# Patient Record
Sex: MALE | Race: BLACK OR AFRICAN AMERICAN | NOT HISPANIC OR LATINO | URBAN - METROPOLITAN AREA
[De-identification: names, ages, dates, MRNs, and addresses within clinical notes are randomized per-mention and may not be internally consistent; named-entity substitution may affect disease eponyms.]

---

## 2019-10-14 ENCOUNTER — EMERGENCY (EMERGENCY)
Facility: HOSPITAL | Age: 29
LOS: 1 days | Discharge: HOME | End: 2019-10-14
Attending: EMERGENCY MEDICINE | Admitting: EMERGENCY MEDICINE
Payer: MEDICAID

## 2019-10-14 VITALS
OXYGEN SATURATION: 98 % | HEART RATE: 70 BPM | TEMPERATURE: 98 F | SYSTOLIC BLOOD PRESSURE: 136 MMHG | DIASTOLIC BLOOD PRESSURE: 87 MMHG | RESPIRATION RATE: 14 BRPM

## 2019-10-14 VITALS
SYSTOLIC BLOOD PRESSURE: 127 MMHG | RESPIRATION RATE: 19 BRPM | DIASTOLIC BLOOD PRESSURE: 83 MMHG | TEMPERATURE: 98 F | HEIGHT: 72 IN | HEART RATE: 79 BPM | OXYGEN SATURATION: 100 % | WEIGHT: 229.94 LBS

## 2019-10-14 LAB
ALBUMIN SERPL ELPH-MCNC: 3.8 G/DL — SIGNIFICANT CHANGE UP (ref 3.3–5)
ALP SERPL-CCNC: 37 U/L — SIGNIFICANT CHANGE UP (ref 30–120)
ALT FLD-CCNC: 18 U/L DA — SIGNIFICANT CHANGE UP (ref 10–60)
ANION GAP SERPL CALC-SCNC: 7 MMOL/L — SIGNIFICANT CHANGE UP (ref 5–17)
AST SERPL-CCNC: 20 U/L — SIGNIFICANT CHANGE UP (ref 10–40)
BILIRUB SERPL-MCNC: 0.2 MG/DL — SIGNIFICANT CHANGE UP (ref 0.2–1.2)
BUN SERPL-MCNC: 8 MG/DL — SIGNIFICANT CHANGE UP (ref 7–23)
CALCIUM SERPL-MCNC: 9.3 MG/DL — SIGNIFICANT CHANGE UP (ref 8.4–10.5)
CHLORIDE SERPL-SCNC: 100 MMOL/L — SIGNIFICANT CHANGE UP (ref 96–108)
CO2 SERPL-SCNC: 30 MMOL/L — SIGNIFICANT CHANGE UP (ref 22–31)
CREAT SERPL-MCNC: 1.03 MG/DL — SIGNIFICANT CHANGE UP (ref 0.5–1.3)
GLUCOSE SERPL-MCNC: 81 MG/DL — SIGNIFICANT CHANGE UP (ref 70–99)
HCT VFR BLD CALC: 42 % — SIGNIFICANT CHANGE UP (ref 39–50)
HGB BLD-MCNC: 13.2 G/DL — SIGNIFICANT CHANGE UP (ref 13–17)
MCHC RBC-ENTMCNC: 26.5 PG — LOW (ref 27–34)
MCHC RBC-ENTMCNC: 31.4 GM/DL — LOW (ref 32–36)
MCV RBC AUTO: 84.3 FL — SIGNIFICANT CHANGE UP (ref 80–100)
NRBC # BLD: 0 /100 WBCS — SIGNIFICANT CHANGE UP (ref 0–0)
PLATELET # BLD AUTO: 199 K/UL — SIGNIFICANT CHANGE UP (ref 150–400)
POTASSIUM SERPL-MCNC: 4.2 MMOL/L — SIGNIFICANT CHANGE UP (ref 3.5–5.3)
POTASSIUM SERPL-SCNC: 4.2 MMOL/L — SIGNIFICANT CHANGE UP (ref 3.5–5.3)
PROT SERPL-MCNC: 7.3 G/DL — SIGNIFICANT CHANGE UP (ref 6–8.3)
RBC # BLD: 4.98 M/UL — SIGNIFICANT CHANGE UP (ref 4.2–5.8)
RBC # FLD: 14 % — SIGNIFICANT CHANGE UP (ref 10.3–14.5)
SODIUM SERPL-SCNC: 137 MMOL/L — SIGNIFICANT CHANGE UP (ref 135–145)
TROPONIN I SERPL-MCNC: 0 NG/ML — LOW (ref 0.02–0.06)
TROPONIN I SERPL-MCNC: 0 NG/ML — LOW (ref 0.02–0.06)
WBC # BLD: 8.25 K/UL — SIGNIFICANT CHANGE UP (ref 3.8–10.5)
WBC # FLD AUTO: 8.25 K/UL — SIGNIFICANT CHANGE UP (ref 3.8–10.5)

## 2019-10-14 PROCEDURE — 93010 ELECTROCARDIOGRAM REPORT: CPT

## 2019-10-14 PROCEDURE — 93005 ELECTROCARDIOGRAM TRACING: CPT

## 2019-10-14 PROCEDURE — 80053 COMPREHEN METABOLIC PANEL: CPT

## 2019-10-14 PROCEDURE — 36415 COLL VENOUS BLD VENIPUNCTURE: CPT

## 2019-10-14 PROCEDURE — 99284 EMERGENCY DEPT VISIT MOD MDM: CPT | Mod: 25

## 2019-10-14 PROCEDURE — 71045 X-RAY EXAM CHEST 1 VIEW: CPT

## 2019-10-14 PROCEDURE — 96374 THER/PROPH/DIAG INJ IV PUSH: CPT

## 2019-10-14 PROCEDURE — 71045 X-RAY EXAM CHEST 1 VIEW: CPT | Mod: 26

## 2019-10-14 PROCEDURE — 85027 COMPLETE CBC AUTOMATED: CPT

## 2019-10-14 PROCEDURE — 99284 EMERGENCY DEPT VISIT MOD MDM: CPT

## 2019-10-14 PROCEDURE — 84484 ASSAY OF TROPONIN QUANT: CPT

## 2019-10-14 RX ORDER — FAMOTIDINE 10 MG/ML
20 INJECTION INTRAVENOUS ONCE
Refills: 0 | Status: COMPLETED | OUTPATIENT
Start: 2019-10-14 | End: 2019-10-14

## 2019-10-14 RX ADMIN — FAMOTIDINE 20 MILLIGRAM(S): 10 INJECTION INTRAVENOUS at 01:50

## 2019-10-14 NOTE — ED PROVIDER NOTE - CHPI ED SYMPTOMS NEG
no diaphoresis/no fever/no syncope/no nausea/no chills/no shortness of breath/no vomiting/no back pain/no cough/no dizziness

## 2019-10-14 NOTE — ED PROVIDER NOTE - OBJECTIVE STATEMENT
Patient c/o chest pain for about 2 hours. States it seems to have started in the epigastric area and spreads around and up to chest. Described as a "stinging" pain. No SOB, no n/v. No sweats. No palpitations. Denies same pain in the past. Non-smoker, no FH of heart disease. No high cholesterol or DM. States hx of HTN.

## 2019-10-14 NOTE — ED ADULT NURSE REASSESSMENT NOTE - NS ED NURSE REASSESS COMMENT FT1
pt seen and examined by dr draper. pt medicated as ordered. md aware of all results. NAD presently. pt d/c to self. cm remained sinus. pt encouraged to follow up with Cardiology and GI

## 2019-10-14 NOTE — ED PROVIDER NOTE - CLINICAL SUMMARY MEDICAL DECISION MAKING FREE TEXT BOX
Patient with epigastric/chest pain. No other symptoms. Will get ekg/labs. Will likely require serial troponins

## 2019-10-14 NOTE — ED PROVIDER NOTE - PATIENT PORTAL LINK FT
You can access the FollowMyHealth Patient Portal offered by [facility] by registering at the following website: [email or web address]. By joining [employer]’s FollowMyHealth portal, you will also be able to view your health information using other applications (apps) compatible with our system.

## 2019-10-14 NOTE — ED ADULT NURSE NOTE - NSIMPLEMENTINTERV_GEN_ALL_ED
Implemented All Universal Safety Interventions:  [place] to call system. Call bell, personal items and telephone within reach. Instruct patient to call for assistance. Room bathroom lighting operational. Non-slip footwear when patient is off stretcher. Physically safe environment: no spills, clutter or unnecessary equipment. Stretcher in lowest position, wheels locked, appropriate side rails in place.

## 2019-11-26 ENCOUNTER — EMERGENCY (EMERGENCY)
Facility: HOSPITAL | Age: 29
LOS: 0 days | Discharge: HOME | End: 2019-11-27
Attending: EMERGENCY MEDICINE
Payer: SELF-PAY

## 2019-11-26 VITALS
WEIGHT: 229.94 LBS | OXYGEN SATURATION: 100 % | RESPIRATION RATE: 18 BRPM | SYSTOLIC BLOOD PRESSURE: 133 MMHG | DIASTOLIC BLOOD PRESSURE: 79 MMHG | HEART RATE: 72 BPM | TEMPERATURE: 98 F

## 2019-11-26 DIAGNOSIS — R10.9 UNSPECIFIED ABDOMINAL PAIN: ICD-10-CM

## 2019-11-26 DIAGNOSIS — R10.13 EPIGASTRIC PAIN: ICD-10-CM

## 2019-11-26 PROCEDURE — 99284 EMERGENCY DEPT VISIT MOD MDM: CPT

## 2019-11-26 PROCEDURE — 99053 MED SERV 10PM-8AM 24 HR FAC: CPT

## 2019-11-26 RX ORDER — FAMOTIDINE 10 MG/ML
20 INJECTION INTRAVENOUS ONCE
Refills: 0 | Status: COMPLETED | OUTPATIENT
Start: 2019-11-26 | End: 2019-11-26

## 2019-11-26 RX ORDER — ONDANSETRON 8 MG/1
4 TABLET, FILM COATED ORAL ONCE
Refills: 0 | Status: COMPLETED | OUTPATIENT
Start: 2019-11-26 | End: 2019-11-26

## 2019-11-27 VITALS
OXYGEN SATURATION: 99 % | HEART RATE: 85 BPM | TEMPERATURE: 98 F | SYSTOLIC BLOOD PRESSURE: 110 MMHG | DIASTOLIC BLOOD PRESSURE: 73 MMHG | RESPIRATION RATE: 18 BRPM

## 2019-11-27 PROBLEM — I95.9 HYPOTENSION, UNSPECIFIED: Chronic | Status: ACTIVE | Noted: 2019-10-14

## 2019-11-27 LAB
ALBUMIN SERPL ELPH-MCNC: 3.7 G/DL — SIGNIFICANT CHANGE UP (ref 3.3–5)
ALP SERPL-CCNC: 38 U/L — LOW (ref 40–120)
ALT FLD-CCNC: 19 U/L — SIGNIFICANT CHANGE UP (ref 12–78)
ANION GAP SERPL CALC-SCNC: 6 MMOL/L — SIGNIFICANT CHANGE UP (ref 5–17)
APPEARANCE UR: CLEAR — SIGNIFICANT CHANGE UP
AST SERPL-CCNC: 25 U/L — SIGNIFICANT CHANGE UP (ref 15–37)
BACTERIA # UR AUTO: ABNORMAL
BILIRUB SERPL-MCNC: 0.8 MG/DL — SIGNIFICANT CHANGE UP (ref 0.2–1.2)
BILIRUB UR-MCNC: NEGATIVE — SIGNIFICANT CHANGE UP
BUN SERPL-MCNC: 9 MG/DL — SIGNIFICANT CHANGE UP (ref 7–23)
CALCIUM SERPL-MCNC: 9 MG/DL — SIGNIFICANT CHANGE UP (ref 8.5–10.1)
CHLORIDE SERPL-SCNC: 105 MMOL/L — SIGNIFICANT CHANGE UP (ref 96–108)
CO2 SERPL-SCNC: 29 MMOL/L — SIGNIFICANT CHANGE UP (ref 22–31)
COLOR SPEC: YELLOW — SIGNIFICANT CHANGE UP
COMMENT - URINE: SIGNIFICANT CHANGE UP
CREAT SERPL-MCNC: 0.97 MG/DL — SIGNIFICANT CHANGE UP (ref 0.5–1.3)
DIFF PNL FLD: ABNORMAL
EPI CELLS # UR: SIGNIFICANT CHANGE UP
ETHANOL SERPL-MCNC: <10 MG/DL — SIGNIFICANT CHANGE UP (ref 0–10)
GLUCOSE SERPL-MCNC: 71 MG/DL — SIGNIFICANT CHANGE UP (ref 70–99)
GLUCOSE UR QL: NEGATIVE MG/DL — SIGNIFICANT CHANGE UP
HCT VFR BLD CALC: 39.3 % — SIGNIFICANT CHANGE UP (ref 39–50)
HGB BLD-MCNC: 12.4 G/DL — LOW (ref 13–17)
KETONES UR-MCNC: ABNORMAL
LEUKOCYTE ESTERASE UR-ACNC: NEGATIVE — SIGNIFICANT CHANGE UP
LIDOCAIN IGE QN: 77 U/L — SIGNIFICANT CHANGE UP (ref 73–393)
MCHC RBC-ENTMCNC: 26.6 PG — LOW (ref 27–34)
MCHC RBC-ENTMCNC: 31.6 GM/DL — LOW (ref 32–36)
MCV RBC AUTO: 84.2 FL — SIGNIFICANT CHANGE UP (ref 80–100)
NITRITE UR-MCNC: NEGATIVE — SIGNIFICANT CHANGE UP
NRBC # BLD: 0 /100 WBCS — SIGNIFICANT CHANGE UP (ref 0–0)
PH UR: 6 — SIGNIFICANT CHANGE UP (ref 5–8)
PLATELET # BLD AUTO: 174 K/UL — SIGNIFICANT CHANGE UP (ref 150–400)
POTASSIUM SERPL-MCNC: 4.3 MMOL/L — SIGNIFICANT CHANGE UP (ref 3.5–5.3)
POTASSIUM SERPL-SCNC: 4.3 MMOL/L — SIGNIFICANT CHANGE UP (ref 3.5–5.3)
PROT SERPL-MCNC: 7.6 GM/DL — SIGNIFICANT CHANGE UP (ref 6–8.3)
PROT UR-MCNC: 15 MG/DL
RBC # BLD: 4.67 M/UL — SIGNIFICANT CHANGE UP (ref 4.2–5.8)
RBC # FLD: 14.2 % — SIGNIFICANT CHANGE UP (ref 10.3–14.5)
RBC CASTS # UR COMP ASSIST: ABNORMAL /HPF (ref 0–4)
SODIUM SERPL-SCNC: 140 MMOL/L — SIGNIFICANT CHANGE UP (ref 135–145)
SP GR SPEC: 1.01 — SIGNIFICANT CHANGE UP (ref 1.01–1.02)
UROBILINOGEN FLD QL: 4 MG/DL
WBC # BLD: 6.29 K/UL — SIGNIFICANT CHANGE UP (ref 3.8–10.5)
WBC # FLD AUTO: 6.29 K/UL — SIGNIFICANT CHANGE UP (ref 3.8–10.5)
WBC UR QL: SIGNIFICANT CHANGE UP

## 2019-11-27 PROCEDURE — 93010 ELECTROCARDIOGRAM REPORT: CPT

## 2019-11-27 RX ADMIN — FAMOTIDINE 20 MILLIGRAM(S): 10 INJECTION INTRAVENOUS at 00:51

## 2019-11-27 RX ADMIN — Medication 30 MILLILITER(S): at 00:51

## 2019-11-27 RX ADMIN — ONDANSETRON 4 MILLIGRAM(S): 8 TABLET, FILM COATED ORAL at 00:52

## 2019-11-27 NOTE — ED PROVIDER NOTE - OBJECTIVE STATEMENT
30yo male with no pertinent pmh presents with epigastric pain today. no fever, NV, diarrhea, dysuria.     ROS: No fever/chills. No photophobia/eye pain/changes in vision, No ear pain/sore throat/dysphagia, No chest pain/palpitations. No SOB/cough. + abdominal pain, No N/V/D, no black/bloody bm. No dysuria/frequency/discharge, No headache. No Dizziness.  No rash.  No numbness/tingling/weakness. 28yo male with no pertinent pmh presents with epigastric pain today. no fever, NV, diarrhea, dysuria. denies radiation into chest. denies cardiac history. of note pt presented to [place] 6 weeks ago, for similar, neg trop x 2, EKG noted with diffuse jpoint elev.     ROS: No fever/chills. No photophobia/eye pain/changes in vision, No ear pain/sore throat/dysphagia, No chest pain/palpitations. No SOB/cough. + abdominal pain, No N/V/D, no black/bloody bm. No dysuria/frequency/discharge, No headache. No Dizziness.  No rash.  No numbness/tingling/weakness.

## 2019-11-27 NOTE — ED PROVIDER NOTE - PATIENT PORTAL LINK FT
You can access the FollowMyHealth Patient Portal offered by [facility] by registering at the following website: [email or web address]. By joining [employer]’s FollowMyHealth portal, you will also be able to view your health information using other applications (apps) compatible with our system.

## 2019-11-27 NOTE — ED PROVIDER NOTE - CLINICAL SUMMARY MEDICAL DECISION MAKING FREE TEXT BOX
Lab values do not require emergent intervention. Pt well appearing, states pain improved, dc to fu with pmd.

## 2019-11-27 NOTE — ED PROVIDER NOTE - CARE PROVIDERS DIRECT ADDRESSES
[email or web address]

## 2019-11-27 NOTE — ED ADULT NURSE NOTE - NSIMPLEMENTINTERV_GEN_ALL_ED
Implemented All Universal Safety Interventions:  [place] to call system. Call bell, personal items and telephone within reach. Instruct patient to call for assistance. Room bathroom lighting operational. Non-slip footwear when patient is off stretcher. Physically safe environment: no spills, clutter or unnecessary equipment. Stretcher in lowest position, wheels locked, appropriate side rails in place.

## 2019-11-27 NOTE — ED ADULT NURSE NOTE - OBJECTIVE STATEMENT
pt received sleeping but easily arousable, oriented x 4 in bed 28. Patient reports upper abd pain/epigastric x couple of hours. denies N/V/D, denies PMHx

## 2019-11-27 NOTE — ED PROVIDER NOTE - PHYSICAL EXAMINATION
Gen: Alert, Well appearing. NAD    Head: NC, AT, PERRL, normal lids/conjunctiva   ENT: Bilateral TM WNL, patent oropharynx without erythema/exudate, uvula midline  Neck: supple, no tenderness/meningismus  Pulm: Bilateral clear BS, normal resp effort  CV: RRR, no M/R/G, +dist pulses   Abd: soft, + mild epigastric tenderness, ND, +BS, no guarding/rebound tenderness  Mskel: extremities x4 with normal ROM. no ctl spine ttp. no edema/erythema/cyanosis   Skin: no rash, no bruising  Neuro: AAOx3, no sensory/motor deficits, CN 2-12 intact

## 2019-11-27 NOTE — ED PROVIDER NOTE - CARE PROVIDER_API CALL
Jose Tabor)  Internal Medicine  [address], [address]  [place] [address]  Phone: [phone]  Fax: [phone]  Follow Up Time:     Rocael Dempsey)  Medicine  [address] [place] [address]  Phone: [phone]  Fax: [phone]  Follow Up Time:

## 2019-11-28 LAB
CULTURE RESULTS: SIGNIFICANT CHANGE UP
SPECIMEN SOURCE: SIGNIFICANT CHANGE UP

## 2021-05-26 NOTE — ED ADULT NURSE NOTE - CARDIO ASSESSMENT
----- Message from Darien Shaw MD sent at 5/26/2021 11:28 AM EDT -----  Notify passed gtt; normal CBC WDL

## 2023-03-03 ENCOUNTER — HOSPITAL ENCOUNTER (EMERGENCY)
Facility: HOSPITAL | Age: 33
Discharge: LEFT BEFORE DISCHARGE | End: 2023-03-03
Attending: EMERGENCY MEDICINE

## 2023-03-03 VITALS
HEIGHT: 72 IN | OXYGEN SATURATION: 99 % | TEMPERATURE: 97.8 F | RESPIRATION RATE: 18 BRPM | DIASTOLIC BLOOD PRESSURE: 94 MMHG | HEART RATE: 62 BPM | SYSTOLIC BLOOD PRESSURE: 141 MMHG | WEIGHT: 260 LBS | BODY MASS INDEX: 35.21 KG/M2

## 2023-03-03 DIAGNOSIS — R10.13 EPIGASTRIC PAIN: Primary | ICD-10-CM

## 2023-03-03 LAB
ANION GAP SERPL CALC-SCNC: 7 MEQ/L (ref 3–15)
BASOPHILS # BLD: 0.03 K/UL (ref 0.01–0.1)
BASOPHILS NFR BLD: 0.4 %
BUN SERPL-MCNC: 17 MG/DL (ref 8–20)
CALCIUM SERPL-MCNC: 9 MG/DL (ref 8.9–10.3)
CHLORIDE SERPL-SCNC: 103 MEQ/L (ref 98–109)
CO2 SERPL-SCNC: 30 MEQ/L (ref 22–32)
CREAT SERPL-MCNC: 1.2 MG/DL (ref 0.8–1.3)
DIFFERENTIAL METHOD BLD: ABNORMAL
EOSINOPHIL # BLD: 0.05 K/UL (ref 0.04–0.54)
EOSINOPHIL NFR BLD: 0.6 %
ERYTHROCYTE [DISTWIDTH] IN BLOOD BY AUTOMATED COUNT: 15.5 % (ref 11.6–14.4)
GFR SERPL CREATININE-BSD FRML MDRD: >60 ML/MIN/1.73M*2
GLUCOSE SERPL-MCNC: 83 MG/DL (ref 70–99)
HCT VFR BLDCO AUTO: 38.3 % (ref 40.1–51)
HGB BLD-MCNC: 11.6 G/DL (ref 13.7–17.5)
IMM GRANULOCYTES # BLD AUTO: 0.04 K/UL (ref 0–0.08)
IMM GRANULOCYTES NFR BLD AUTO: 0.5 %
LYMPHOCYTES # BLD: 1.78 K/UL (ref 1.2–3.5)
LYMPHOCYTES NFR BLD: 21.7 %
MCH RBC QN AUTO: 25.4 PG (ref 28–33.2)
MCHC RBC AUTO-ENTMCNC: 30.3 G/DL (ref 32.2–36.5)
MCV RBC AUTO: 84 FL (ref 83–98)
MONOCYTES # BLD: 0.59 K/UL (ref 0.3–1)
MONOCYTES NFR BLD: 7.2 %
NEUTROPHILS # BLD: 5.72 K/UL (ref 1.7–7)
NEUTS SEG NFR BLD: 69.6 %
NRBC BLD-RTO: 0 %
PDW BLD AUTO: 10.6 FL (ref 9.4–12.4)
PLATELET # BLD AUTO: 214 K/UL (ref 150–350)
POTASSIUM SERPL-SCNC: 3.9 MEQ/L (ref 3.6–5.1)
RBC # BLD AUTO: 4.56 M/UL (ref 4.5–5.8)
SODIUM SERPL-SCNC: 140 MEQ/L (ref 136–144)
WBC # BLD AUTO: 8.21 K/UL (ref 3.8–10.5)

## 2023-03-03 PROCEDURE — 99283 EMERGENCY DEPT VISIT LOW MDM: CPT | Mod: 25

## 2023-03-03 PROCEDURE — 36415 COLL VENOUS BLD VENIPUNCTURE: CPT

## 2023-03-03 PROCEDURE — 85025 COMPLETE CBC W/AUTO DIFF WBC: CPT

## 2023-03-03 PROCEDURE — 80048 BASIC METABOLIC PNL TOTAL CA: CPT

## 2023-03-03 NOTE — DISCHARGE INSTRUCTIONS
Please return to the ER for any fever/chills/nausea/vomiting/worsening symptoms.  Please follow-up with your primary care doctor for further evaluation and treatment

## 2023-03-04 ASSESSMENT — ENCOUNTER SYMPTOMS
BELCHING: 0
CONSTIPATION: 0
SHORTNESS OF BREATH: 0
CHILLS: 0
FATIGUE: 0
ANOREXIA: 0
FEVER: 0
FLATUS: 0
ABDOMINAL PAIN: 1

## 2023-03-04 NOTE — ED PROVIDER NOTES
Emergency Medicine Note  HPI   HISTORY OF PRESENT ILLNESS     Is a 32-year-old male, who presented to the ER with abdominal pain..  Pain was located in the epigastric area.  No nausea/vomiting/diaphoresis.  Patient presented to the ER room, to be valid by myself.  At that time, patient states he did not want to have any lab test done.  Patient stated that his abdominal pain had resolved.  Patient denies any fever/chills/back pain      History provided by:  Patient   used: No    Abdominal Pain  Pain location:  Epigastric  Associated symptoms: no anorexia, no belching, no chest pain, no chills, no constipation, no fatigue, no fever, no flatus and no shortness of breath          Patient History   PAST HISTORY     Reviewed from Nursing Triage:       No past medical history on file.    No past surgical history on file.    No family history on file.           Review of Systems   REVIEW OF SYSTEMS     Review of Systems   Constitutional: Negative for chills, fatigue and fever.   Respiratory: Negative for shortness of breath.    Cardiovascular: Negative for chest pain.   Gastrointestinal: Positive for abdominal pain. Negative for anorexia, constipation and flatus.         VITALS     ED Vitals    Date/Time Temp Pulse Resp BP SpO2 [facility]   03/03/23 0736 36.6 °C (97.8 °F) 62 18 141/94 99 % JE   03/03/23 0047 36.8 °C (98.2 °F) 90 18 131/76 100 % IO                       Physical Exam   PHYSICAL EXAM     Physical Exam  Constitutional:       General: He is awake.      Appearance: He is well-developed and well-groomed. He is not ill-appearing, toxic-appearing or diaphoretic.   HENT:      Head: Normocephalic and atraumatic.   Cardiovascular:      Rate and Rhythm: Normal rate and regular rhythm.      Heart sounds: Normal heart sounds.   Pulmonary:      Effort: No tachypnea, bradypnea, accessory muscle usage or prolonged expiration.      Breath sounds: Normal air entry.   Abdominal:      General: Abdomen is  protuberant.      Palpations: Abdomen is soft.      Tenderness: There is no abdominal tenderness.   Neurological:      Mental Status: He is alert, oriented to person, place, and time and easily aroused.           PROCEDURES     Procedures     DATA     Results     Procedure Component Value Units Date/Time    Basic metabolic panel [[id number]]  (Normal) Collected: 03/03/23 0107    Specimen: Blood, Venous Updated: 03/03/23 0131     Sodium 140 mEQ/L      Potassium 3.9 mEQ/L      Comment: Results obtained on plasma. Plasma Potassium values may be up to 0.4 mEQ/L less than serum values. The differences may be greater for patients with high platelet or white cell counts.        Chloride 103 mEQ/L      CO2 30 mEQ/L      BUN 17 mg/dL      Creatinine 1.2 mg/dL      Glucose 83 mg/dL      Calcium 9.0 mg/dL      eGFR >60.0 mL/min/1.73m*2      Anion Gap 7 mEQ/L     CBC and differential [[id number]]  (Abnormal) Collected: 03/03/23 0107    Specimen: Blood, Venous Updated: 03/03/23 0114     WBC 8.21 K/uL      RBC 4.56 M/uL      Hemoglobin 11.6 g/dL      Hematocrit 38.3 %      MCV 84.0 fL      MCH 25.4 pg      MCHC 30.3 g/dL      RDW 15.5 %      Platelets 214 K/uL      MPV 10.6 fL      Differential Type Auto     nRBC 0.0 %      Immature Granulocytes 0.5 %      Neutrophils 69.6 %      Lymphocytes 21.7 %      Monocytes 7.2 %      Eosinophils 0.6 %      Basophils 0.4 %      Immature Granulocytes, Absolute 0.04 K/uL      Neutrophils, Absolute 5.72 K/uL      Lymphocytes, Absolute 1.78 K/uL      Monocytes, Absolute 0.59 K/uL      Eosinophils, Absolute 0.05 K/uL      Basophils, Absolute 0.03 K/uL           Imaging Results    None         No orders to display       Scoring tools                                  ED Course & MDM   MDM / ED COURSE / CLINICAL IMPRESSION / DISPO     Medical Decision Making  32-year-old presents here with signs above  Rule outs gastritis, cholecystitis.  Patient does not want any labs or further imaging.  Patient to  leave at this time    Epigastric pain: acute illness or injury      ED Course as of 03/04/23 1224   Fri Mar 03, 2023   0735 Patient's returned to the back.  Patient states his heart pain is dissipated.  He did not want to have more labs.  He did not want to any further work-up.  He is agreement, to have repeat.    My exam did not find anything acute.  Patient to be discharged [EK]      ED Course User Index  [EK] Amirah Hargrove,      Clinical Impression      Epigastric pain     _________________     ED Disposition   Amirah Lincoln,   03/04/23 1222
